# Patient Record
Sex: MALE | Race: WHITE | NOT HISPANIC OR LATINO | Employment: FULL TIME
[De-identification: names, ages, dates, MRNs, and addresses within clinical notes are randomized per-mention and may not be internally consistent; named-entity substitution may affect disease eponyms.]

---

## 2020-03-10 ENCOUNTER — HEALTH MAINTENANCE LETTER (OUTPATIENT)
Age: 35
End: 2020-03-10

## 2020-12-27 ENCOUNTER — HEALTH MAINTENANCE LETTER (OUTPATIENT)
Age: 35
End: 2020-12-27

## 2021-04-24 ENCOUNTER — HEALTH MAINTENANCE LETTER (OUTPATIENT)
Age: 36
End: 2021-04-24

## 2021-10-04 ENCOUNTER — HEALTH MAINTENANCE LETTER (OUTPATIENT)
Age: 36
End: 2021-10-04

## 2022-02-21 ENCOUNTER — VIRTUAL VISIT (OUTPATIENT)
Dept: PSYCHOLOGY | Facility: CLINIC | Age: 37
End: 2022-02-21
Payer: COMMERCIAL

## 2022-02-21 DIAGNOSIS — F91.8 CONDUCT DISORDER, UNDIFFERENTIATED TYPE: ICD-10-CM

## 2022-02-21 DIAGNOSIS — F41.1 GENERALIZED ANXIETY DISORDER: Primary | ICD-10-CM

## 2022-02-21 PROCEDURE — 90791 PSYCH DIAGNOSTIC EVALUATION: CPT | Mod: GT | Performed by: MARRIAGE & FAMILY THERAPIST

## 2022-02-21 PROCEDURE — 99207 PR NO BILLABLE SERVICE THIS VISIT: CPT | Performed by: MARRIAGE & FAMILY THERAPIST
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
## 2022-02-28 ENCOUNTER — VIRTUAL VISIT (OUTPATIENT)
Dept: PSYCHOLOGY | Facility: CLINIC | Age: 37
End: 2022-02-28
Payer: COMMERCIAL

## 2022-02-28 DIAGNOSIS — F41.1 GENERALIZED ANXIETY DISORDER: Primary | ICD-10-CM

## 2022-02-28 DIAGNOSIS — F91.8 CONDUCT DISORDER, UNDIFFERENTIATED TYPE: ICD-10-CM

## 2022-02-28 PROCEDURE — 90837 PSYTX W PT 60 MINUTES: CPT | Mod: GT | Performed by: MARRIAGE & FAMILY THERAPIST

## 2022-02-28 PROCEDURE — 99207 PR NO BILLABLE SERVICE THIS VISIT: CPT | Performed by: MARRIAGE & FAMILY THERAPIST

## 2022-02-28 NOTE — PROGRESS NOTES
[facility] - Progress Note    Date of Service: 22   Name: George C Kellerman  : 1985  Medical Record Number: [id number]  Treating Provider: VALERIA Zamora LADC  Type of Session: Individual  Present in Session: pt  Session Start and Stop Time: 6670-6341  Number of Minutes:  63    SERVICE MODALITY:  Video Visit:      Provider verified identity through the following two step process.  Patient provided:  Patient     Telemedicine Visit: The patient's condition can be safely assessed and treated via synchronous audio and visual telemedicine encounter.      Reason for Telemedicine Visit: Services only offered telehealth    Originating Site (Patient Location): Patient's home    Distant Site (Provider Location): Provider Remote Setting- Home Office    Consent:  The patient/guardian has verbally consented to: the potential risks and benefits of telemedicine (video visit) versus in person care; bill my insurance or make self-payment for services provided; and responsibility for payment of non-covered services.     Patient would like the video invitation sent by:  My Chart    Mode of Communication:  Video Conference via [facility]    As the provider I attest to compliance with applicable laws and regulations related to telemedicine.    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.8) Other specified Disruptive, Impulse Control and Conduct Disorder     Provisional Diagnostic Hypothesis (Explain R/O, other Provisional Diagnosis, and why alternative Diagnosis that were considered were ruled out): R/O ADHD      Current Reported Symptoms and Status update:  Pressing concern right now is needs vs wants-feelings for affair partner  Story tells himself-grew up struggled with anything sexual-with affair partner was comfortable being completely open and vulnerable with sexuality, feeling reciprocity and desire-feeling loved for who he is -feeling validated in  sexuality expression-    With wife sexuality was met with indifference-feeling that opposite of love is not hate its indifference-wife is ok with me being me but isnt in love with that part of me, is not in love with me with the part I want to be vulnerable with-no reciprocal desire, wouldn't be into having sex with him-she would force herself to engage with sex with him.    Thinks about contacting affair partner-wants some sort of closure-stops him-does not want to sneak and lie and try to live his values-trying to figure out if he is dysfunctional    Progress Toward Treatment Goals:   Created treatment plan in session     Therapeutic Interventions/Treatment Strategies:    Area(s) of treatment focus addressed in this session included Symptom Management.    Created treatment plan-started discussion about processing feelings for affair partner and being authentic and vulnerable     Psychotherapist offered support, feedback and validation. Treatment modalities used include Motivational Interviewing. Interventions include Relationship Skills: Discussed strategies to promote healthier understanding of interpersonal relationships.  Support, Structured Activity and Clarification    Patient Response:   Patient responded to session by accepting feedback, listening, focusing on goals, being attentive, accepting support and appearing alert  Possible barriers to participation / learning include: and no barriers identified    Current Mental Status Exam:   Appearance:  Appropriate   Eye Contact:  Good   Attitude / Demeanor: Cooperative   Speech      Rate / Production: Normal/ Responsive      Volume:  Normal  volume  Orientation:  All  Mood:   Anxious   Affect:   Appropriate   Thought Content: Clear   Insight:   Good       Plan/Need for Future Services:  Return for therapy in 2 weeks to treat diagnosed problems.    Patient has a current master individualized treatment plan and today was our weekly review of the patient's  "progress.  See Epic treatment plan for progress / updates on goals and plan.    Assignment:  Complete relationship map for yourself as you see yourself in your current relationship with wife-all of the other things that make up that map/system including your children/pets/relatives/friends-make sure to write down your thoughts/feelings/behaviors that come up most often for you in these relationships.  For contrast you will complete a separate relationship map as you see yourself as you \"want to be\" possibly with affair partner-write down the thoughts/feelings/behaviors that come up for you when viewing your relationship with yourself and others-it possible to include the same people from your first relationship map (wife/children/friends/family) but make sure to demonstrate how the relationship changes on the second map-demonstrate strengths/bonds/conflict by drawing or writing that out    Interactive Complexity:  There are four specific communication difficulties that complicate the work of the primary psychiatric procedure.  Interactive complexity (+38088) may be reported when at least one of these difficulties is present.    Communication difficulties present during current the psychiatric procedure include:  1. None.      Signature/Title:    VALERIA Zamora, [facility]     [facility]:  Individualized Treatment Plan     Date of Plan: 2022  Name: George C Kellerman MRN: [id number]  : 1985     DSM-V Diagnoses: 300.02 (F41.1) Generalized Anxiety Disorder  312.89 (F91.8) Other specified Disruptive, Impulse Control and Conduct Disorder     Provisional Diagnostic Hypothesis (Explain R/O, other Provisional Diagnosis, and why alternative Diagnosis that were considered were ruled out): R/O ADHD    Psychosocial / Contextual Factors: living separate from wife    Program: CSB    Referral / Collaboration:  Referral to another professional/service is not indicated at this " time..    SERVICE MODALITY:  Video Visit:      Provider verified identity through the following two step process.  Patient provided:  Patient     Telemedicine Visit: The patient's condition can be safely assessed and treated via synchronous audio and visual telemedicine encounter.      Reason for Telemedicine Visit: Services only offered telehealth    Originating Site (Patient Location): Patient's home    Distant Site (Provider Location): Provider Remote Setting- Home Office    Consent:  The patient/guardian has verbally consented to: the potential risks and benefits of telemedicine (video visit) versus in person care; bill my insurance or make self-payment for services provided; and responsibility for payment of non-covered services.     Patient would like the video invitation sent by:  My Chart    Mode of Communication:  Video Conference via [facility]    As the provider I attest to compliance with applicable laws and regulations related to telemedicine.    Start Date: 2022  Anticipated duration/number of sessions: 36 (pending authorization/clinical changes)    Impact of Symptoms on Function:  Decreased Physical/Health Status  Decreased Social/Family Function    Sexual Problems:  Compulsive sexual behavior     Gender Concerns:  denies    Client Strengths:  goal-focused, has a previous history of therapy, motivated, open to learning and open to suggestions / feedback    Client Participation in Plan:  Contributed to goals and plan   Attended individual treatment plan meeting on 2022  Agrees with plan   Received copy of treatment plan   Discussed with staff     Areas of Vulnerability:  Anxiety  Trauma/Abuse/Neglect    Long-Term Goals:  Knowledge about illness and management of symptoms   Effective management of impulsivity   Figuring out sexual identity-understanding differences between needs/wants and integrating inner story with outer presentation    Discharge Criteria:  Satisfactory progress toward  "treatment goals   Improvement re: identified problems and symptoms   Ability to continue recovery at next level of service   Has a discharge plan in place   Has safety/coping plan in place   Regular attendance as scheduled     Areas of Treatment Focus     Why are you seeking treatment/What do you want to focus on during treatment? \"recurrent feelings for affair partner, sexual identity, unknown unknowns\"       Area of Treatment Focus:   Symptom Stabilization and Management  Start Date:    2/28/2022    Goal: Target Date: 2/28/2023 Status: Active  -Will increase self-awareness, self-validation, authenticity. Practice stating your feelings, opinions, and preferences in group and in personal life., Use journaling to identify your experience of events in your daily life. and Let other people know something about yourself that you typically keep to yourself.,  - Will report on symptoms and identify skills to use to manage anxiety, CSB, cognitive distortions, emotional regulation, relationship boundaries  - Will increase daily structured activities by keeping a schedule as much as possible to reduce distractibility  - Will learn and practice 1-2 coping skills to help improve relationships, self compassion, vulnerability, self identity, explore sexual and relationship history   - Will plan and problem-solve to manage triggers, impulsivity, acting out behaviors  - Will utilize structured tasks to assess and improve relationship with self and others  -Will Improve Mindfulness / Stay in the Here and Now Regularly practice intentionally focusing on what is occurring in the environment, what you are sensing, thoughts that are popping into your head, emotions that you are feeling, without judging any of it, just noticing it.    -Will improve self-talk, reduce negative self-talk and increase neutral or positive self-talk. Given what you know about your own negative self-talk, create statements that challenge that self-talk, either " "in a positive or neutral tone.  These new self-statements are often most effective when they are phrased  in a way that uses I statements, have more detail or nuance and avoid using \"not\" \"no\" or other negatives.  -will process and explore feelings for affair partner with therapist  -will process and explore sexual identity (poly?) with therapist   -will process and explore needs vs wants-sexuality-sexual health and wellness    Progress:           Treatment Strategies:   Facilitate increased self awareness  Provide education regarding relationships, emotional regulation, sexual wellness  Teach adaptive coping skills and communication skills  Use reality based supportive approach   Intervention(s):  Therapist will assign homework bi weekly  Therapist will teach about healthy boundaries. .  Therapist will provide educational materials on topics discussed in therapy  role-play effective communication skills     Area of Treatment Focus:   Community Resources / Support and Discharge Planning  Start Date:    2/28/2022    Goal: Target Date: 2/28/2023 Status: Active  Will develop an aftercare / transition plan by by time of discharge          Progress:           Treatment Strategies:   Assist clients in establishing / strengthening support network  Assist with discharge planning  Assess / reassess for appropriate therapy program involvement, encourage participation in therapies  Teach adaptive coping skills and communication skills  Use reality based supportive approach   Intervention(s):  Therapist will make referrals to as needed     See treatment plan signature page for patient and provider signature     The Individualized Treatment Plan Signature Page has been routed to the provider for co-sign (as required).    VALERIA Zamora, [facility]      "

## 2022-02-28 NOTE — PROGRESS NOTES
[facility]  [address] [place] [address], [address]  [place] [address]  Phone: [phone]  Fax: [phone]  [email or web address]    [facility]:   Acknowledgement of Current Treatment Plan       I have reviewed my treatment plan with my therapist on 2/28/2022.   I agree with the plan as it is written in the electronic health record.    Name:      Signature:  George C Kellerman       Unable to sign due to covid 19 protocols   VALERIA Vela  Psychotherapist   VALERIA Zamora, [facility] on 2/28/2022 at 9:41 AM           Regulatory Guidelines for Updating Treatment Plan  [place] Medical Assistance: Reviewed & signed at least every 90days  Medicare:  Update per policy

## 2022-03-14 ENCOUNTER — VIRTUAL VISIT (OUTPATIENT)
Dept: PSYCHOLOGY | Facility: CLINIC | Age: 37
End: 2022-03-14
Payer: COMMERCIAL

## 2022-03-14 DIAGNOSIS — F41.1 GENERALIZED ANXIETY DISORDER: Primary | ICD-10-CM

## 2022-03-14 DIAGNOSIS — F91.8 CONDUCT DISORDER, UNDIFFERENTIATED TYPE: ICD-10-CM

## 2022-03-14 PROCEDURE — 90837 PSYTX W PT 60 MINUTES: CPT | Mod: GT | Performed by: MARRIAGE & FAMILY THERAPIST

## 2022-03-14 PROCEDURE — 99207 PR NO BILLABLE SERVICE THIS VISIT: CPT | Performed by: MARRIAGE & FAMILY THERAPIST

## 2022-03-14 NOTE — PROGRESS NOTES
[facility] - Progress Note    Date of Service: 3/14/22   Name: George C Kellerman  : 1985  Medical Record Number: [id number]  Treating Provider: VALERIA Zamora LADC  Type of Session: Individual  Present in Session: pt  Session Start and Stop Time: 9894-9361  Number of Minutes:  57    SERVICE MODALITY:  Video Visit:      Provider verified identity through the following two step process.  Patient provided:  Patient is known previously to provider    Telemedicine Visit: The patient's condition can be safely assessed and treated via synchronous audio and visual telemedicine encounter.      Reason for Telemedicine Visit: Services only offered telehealth    Originating Site (Patient Location): Patient's home    Distant Site (Provider Location): Provider Remote Setting- Home Office    Consent:  The patient/guardian has verbally consented to: the potential risks and benefits of telemedicine (video visit) versus in person care; bill my insurance or make self-payment for services provided; and responsibility for payment of non-covered services.     Patient would like the video invitation sent by:  My Chart    Mode of Communication:  Video Conference via [facility]    As the provider I attest to compliance with applicable laws and regulations related to telemedicine.    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.8) Other specified Disruptive, Impulse Control and Conduct Disorder     Provisional Diagnostic Hypothesis (Explain R/O, other Provisional Diagnosis, and why alternative Diagnosis that were considered were ruled out): R/O ADHD    Current Reported Symptoms and Status update:  Complete relationship map for yourself as you see yourself in your current relationship with wife-all of the other things that make up that map/system including your children/pets/relatives/friends-make sure to write down your thoughts/feelings/behaviors that come up most often  "for you in these relationships.  For contrast you will complete a separate relationship map as you see yourself as you \"want to be\" possibly with affair partner-write down the thoughts/feelings/behaviors that come up for you when viewing your relationship with yourself and others-it possible to include the same people from your first relationship map (wife/children/friends/family) but make sure to demonstrate how the relationship changes on the second map-demonstrate strengths/bonds/conflict by drawing or writing that out    Pt reports increased depression and anxiety symptoms, struggling with the concept of hope-feeling like his relationship is slipping away through his fingers-struggling the RENA point of \"recovery\" meaning no masturbation, increased despair over not being able to feel authentic or be vulnerable with wife, having idealized versions of the other partner-shared and discussed his thoughts about masturbation and how its impact in the relationship with his wife-  Progress Toward Treatment Goals:   Minimal progress - CONTEMPLATION (Considering change and yet undecided); Intervened by assessing the negative and positive thinking (ambivalence) about behavior change    Therapeutic Interventions/Treatment Strategies:    Area(s) of treatment focus addressed in this session included Symptom Management and Develop Socialization / Interpersonal Relationship Skills.    Pt processed his values, struggling with authenticity, vulnerability, processed the \"crisis of identity\" emotions of the past few weeks and increased struggle with feeling hopeful about relationship experiencing judgement, contempt-struggling with masturbation interpretation and judgements-trying to explore thoughts about masturbation    Psychotherapist offered support, feedback and validation and reinforced use of skills. Treatment modalities used include Motivational Interviewing, Cognitive Behavioral Therapy and Dialectical Behavioral Therapy. " Interventions include Cognitive Restructuring:  Assisted patient in formulating new neutral/positive alternatives to challenge less helpful / ineffective thoughts and Relationship Skills: Assisted patients in implementing more effective communication skills in their relationships and Discussed strategies to promote healthier understanding of interpersonal relationships.  Support, Feedback, Limit/Boundaries, Problem Solving, Clarification and Education    Patient Response:   Patient responded to session by accepting feedback, listening, being attentive, accepting support and appearing alert  Possible barriers to participation / learning include: severity of symptoms    Current Mental Status Exam:   Appearance:  Appropriate   Eye Contact:  Good   Attitude / Demeanor: Cooperative   Speech      Rate / Production: Normal/ Responsive      Volume:  Normal  volume  Orientation:  All  Mood:   Anxious  Sad   Affect:   Blunted  Subdued   Thought Content: Clear   Insight:   Good       Plan/Need for Future Services:  Return for therapy in 2 weeks to treat diagnosed problems.    Patient has a current master individualized treatment plan.  See Epic treatment plan for more information.    Assignment:  Do values assignment-  Watch [employer] video on [employer]-journal thoughts    Interactive Complexity:  There are four specific communication difficulties that complicate the work of the primary psychiatric procedure.  Interactive complexity (+60969) may be reported when at least one of these difficulties is present.    Communication difficulties present during current the psychiatric procedure include:  1. None.      Signature/Title:    Junior Winchester, VALERIA, [facility]

## 2022-03-28 ENCOUNTER — VIRTUAL VISIT (OUTPATIENT)
Dept: PSYCHOLOGY | Facility: CLINIC | Age: 37
End: 2022-03-28
Payer: COMMERCIAL

## 2022-03-28 DIAGNOSIS — F91.8 CONDUCT DISORDER, UNDIFFERENTIATED TYPE: ICD-10-CM

## 2022-03-28 DIAGNOSIS — F41.1 GENERALIZED ANXIETY DISORDER: Primary | ICD-10-CM

## 2022-03-28 PROCEDURE — 99207 PR NO BILLABLE SERVICE THIS VISIT: CPT | Performed by: MARRIAGE & FAMILY THERAPIST

## 2022-03-28 PROCEDURE — 90837 PSYTX W PT 60 MINUTES: CPT | Mod: GT | Performed by: MARRIAGE & FAMILY THERAPIST

## 2022-03-28 NOTE — PROGRESS NOTES
[facility] - Progress Note    Date of Service: 3/28/22   Name: George C Kellerman  : 1985  Medical Record Number: [id number]  Treating Provider: VALERIA Suazo LADC   Type of Session: Individual  Present in Session: pt  Session Start and Stop Time: 6155-2311  Number of Minutes:  55    SERVICE MODALITY:  Video Visit:      Provider verified identity through the following two step process.  Patient provided:  Patient  and Patient address    Telemedicine Visit: The patient's condition can be safely assessed and treated via synchronous audio and visual telemedicine encounter.      Reason for Telemedicine Visit: Patient has requested telehealth visit    Originating Site (Patient Location): Patient's home    Distant Site (Provider Location): Provider Remote Setting- Home Office    Consent:  The patient/guardian has verbally consented to: the potential risks and benefits of telemedicine (video visit) versus in person care; bill my insurance or make self-payment for services provided; and responsibility for payment of non-covered services.     Patient would like the video invitation sent by:  Send to e-mail at: [email or web address]    Mode of Communication:  Video Conference via [facility]    As the provider I attest to compliance with applicable laws and regulations related to telemedicine.    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.8) Other specified Disruptive, Impulse Control and Conduct Disorder     Provisional Diagnostic Hypothesis (Explain R/O, other Provisional Diagnosis, and why alternative Diagnosis that were considered were ruled out): R/O ADHD      Current Reported Symptoms and Status update:  Do values assignment-  Watch [employer] video on [employer]-journal thoughts    Shared [employer] video with wife-its hard to say looking back if its a part of the negative cycle, noticing how the conflict shows up subtly, was taught that being open  "and loving is a recipe for disaster, not to address things that are important, did not learn base level safety for conversations, increased anxiety over the past few weeks, did values work, ranked values-included safety for the first time to realize that safety is necessary for decision making,     Who is jasmyne based on values-love-romantic, safety (safe haven), fun, peace, independence  (pursue interests and dreams), wealth (supports fun), family, humor, reason, variety    Progress Toward Treatment Goals:   Minimal progress - CONTEMPLATION (Considering change and yet undecided); Intervened by assessing the negative and positive thinking (ambivalence) about behavior change    Therapeutic Interventions/Treatment Strategies:    Area(s) of treatment focus addressed in this session included Symptom Management and Develop Socialization / Interpersonal Relationship Skills.    Processed about conflict styles, what he learned from his family of origin, processed thoughts and feelings from emotions -not having strong concept of identity-explored what does it mean to \"be yourself\" and how he showed up in marriage vs affairs     Psychotherapist offered support, feedback and validation and reinforced use of skills. Treatment modalities used include Motivational Interviewing, Cognitive Behavioral Therapy and Dialectical Behavioral Therapy. Interventions include Cognitive Restructuring:  Facilitated recognition of the connection between negative thoughts and negative core beliefs and Relationship Skills: Assisted patients in implementing more effective communication skills in their relationships.  Support, Feedback, Problem Solving and Clarification    Patient Response:   Patient responded to session by accepting feedback, listening, being attentive, accepting support and appearing alert  Possible barriers to participation / learning include: and no barriers identified    Current Mental Status Exam:   Appearance:  Appropriate "   Eye Contact:  Good   Attitude / Demeanor: Cooperative   Speech      Rate / Production: Normal/ Responsive      Volume:  Normal  volume  Orientation:  All  Mood:   Anxious  Depressed   Affect:   Appropriate  Blunted   Thought Content: Clear   Insight:   Good       Plan/Need for Future Services:  Return for therapy in 2 weeks to treat diagnosed problems.    Patient has a current master individualized treatment plan.  See Epic treatment plan for more information.    Assignment:  Return in a few weeks, continue thinking about the actions that are paired with your values-and start thinking and writing down the limits and boundaries around the actions of the values    Interactive Complexity:  There are four specific communication difficulties that complicate the work of the primary psychiatric procedure.  Interactive complexity (+28165) may be reported when at least one of these difficulties is present.    Communication difficulties present during current the psychiatric procedure include:  1. None.      Signature/Title:    VALERIA Suazo, [facility]

## 2022-04-11 ENCOUNTER — VIRTUAL VISIT (OUTPATIENT)
Dept: PSYCHOLOGY | Facility: CLINIC | Age: 37
End: 2022-04-11
Payer: COMMERCIAL

## 2022-04-11 DIAGNOSIS — F91.8 CONDUCT DISORDER, UNDIFFERENTIATED TYPE: ICD-10-CM

## 2022-04-11 DIAGNOSIS — F41.1 GENERALIZED ANXIETY DISORDER: Primary | ICD-10-CM

## 2022-04-11 PROCEDURE — 99207 PR NO BILLABLE SERVICE THIS VISIT: CPT | Performed by: MARRIAGE & FAMILY THERAPIST

## 2022-04-11 PROCEDURE — 90837 PSYTX W PT 60 MINUTES: CPT | Mod: GT | Performed by: MARRIAGE & FAMILY THERAPIST

## 2022-04-11 NOTE — PROGRESS NOTES
[place] [facility] - Progress Note    Date of Service: 22   Name: George C Kellerman  : 1985  Medical Record Number: [id number]  Treating Provider: Kody Winchester  Type of Session: Individual  Present in Session: pt  Session Start and Stop Time:   Number of Minutes:  59    SERVICE MODALITY:  Video Visit:      Provider verified identity through the following two step process.  Patient provided:  Patient     Telemedicine Visit: The patient's condition can be safely assessed and treated via synchronous audio and visual telemedicine encounter.      Reason for Telemedicine Visit: Patient has requested telehealth visit    Originating Site (Patient Location): Patient's home    Distant Site (Provider Location): Provider Remote Setting- Home Office    Consent:  The patient/guardian has verbally consented to: the potential risks and benefits of telemedicine (video visit) versus in person care; bill my insurance or make self-payment for services provided; and responsibility for payment of non-covered services.     Patient would like the video invitation sent by:  My Chart    Mode of Communication:  Video Conference via [facility]    As the provider I attest to compliance with applicable laws and regulations related to telemedicine.    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.8) Other specified Disruptive, Impulse Control and Conduct Disorder     Provisional Diagnostic Hypothesis (Explain R/O, other Provisional Diagnosis, and why alternative Diagnosis that were considered were ruled out): R/O ADHD      Current Reported Symptoms and Status update:  Return in a few weeks, continue thinking about the actions that are paired with your values-and start thinking and writing down the limits and boundaries around the actions of the values    Pt is seeing support group on Monday 1.5 hours, during the week sees Felipa Antonio weekly for therapy 1:1-is exploring  emotional needs with her is working on 3 topics, started with EMDR-will see every 2 weeks; does a Wednesday and Saturday support group; Thursday night sex addicts anonymous, every 3 weeks sees psychiatrist for medication management    Explored homework and how peace and love are values-boundaries-did not have them, willing to have affairs, avoid emotional intimacy, now is deciding boundaries are living in a way that is not different on the outside, no secrets with wife-open with friends, independence, safety     Hard boundaries  flexibility with money-related to independence value  Need to feel love/peace deep down, presence of 2 parts making a whole, experiences I need to feel seen, desired, safe to be open, security means validation and desire-related to love and peace value    Progress Toward Treatment Goals:   Minimal progress - CONTEMPLATION (Considering change and yet undecided); Intervened by assessing the negative and positive thinking (ambivalence) about behavior change    Therapeutic Interventions/Treatment Strategies:    Area(s) of treatment focus addressed in this session included Symptom Management and Develop Socialization / Interpersonal Relationship Skills.    Discussed importance of having conjoint session-has concerns over how his issues are triggering for her, struggling with overthinking, explored boundaries    Psychotherapist offered support, feedback and validation and reinforced use of skills. Treatment modalities used include Motivational Interviewing, Cognitive Behavioral Therapy and Dialectical Behavioral Therapy. Interventions include Relationship Skills: Assisted patients in implementing more effective communication skills in their relationships, Encouraged development and maintenance  of healthy boundaries and Discussed strategies to promote healthier understanding of interpersonal relationships.  Support, Feedback, Problem Solving and Education    Patient Response:   Patient responded  to session by accepting feedback, listening, being attentive, accepting support, appearing alert and verbalizing understanding  Possible barriers to participation / learning include: and no barriers identified    Current Mental Status Exam:   Appearance:  Appropriate   Eye Contact:  Good   Attitude / Demeanor: Cooperative   Speech      Rate / Production: Normal/ Responsive      Volume:  Normal  volume  Orientation:  All  Mood:   Anxious   Affect:   Appropriate   Thought Content: Clear   Insight:   Good       Plan/Need for Future Services:  Return for therapy in 2 weeks to treat diagnosed problems.    Patient has a current master individualized treatment plan.  See Epic treatment plan for more information.    Assignment:  Work on triggers assignment     Interactive Complexity:  There are four specific communication difficulties that complicate the work of the primary psychiatric procedure.  Interactive complexity (+04825) may be reported when at least one of these difficulties is present.    Communication difficulties present during current the psychiatric procedure include:  1. None.      Signature/Title:    VALERIA Zamora, [facility]

## 2022-04-25 ENCOUNTER — VIRTUAL VISIT (OUTPATIENT)
Dept: PSYCHOLOGY | Facility: CLINIC | Age: 37
End: 2022-04-25
Payer: COMMERCIAL

## 2022-04-25 DIAGNOSIS — F91.8 CONDUCT DISORDER, UNDIFFERENTIATED TYPE: ICD-10-CM

## 2022-04-25 DIAGNOSIS — F41.1 GENERALIZED ANXIETY DISORDER: Primary | ICD-10-CM

## 2022-04-25 PROCEDURE — 99207 PR NO BILLABLE SERVICE THIS VISIT: CPT | Performed by: MARRIAGE & FAMILY THERAPIST

## 2022-04-25 PROCEDURE — 90837 PSYTX W PT 60 MINUTES: CPT | Mod: GT | Performed by: MARRIAGE & FAMILY THERAPIST

## 2022-04-25 NOTE — PROGRESS NOTES
[place] [facility] - Progress Note    Date of Service: 22   Name: George C Kellerman  : 1985  Medical Record Number: [id number]  Treating Provider: VALERIA Zamora LADC  Type of Session: Individual  Present in Session: pt  Session Start and Stop Time: 3105-9871  Number of Minutes:  54    SERVICE MODALITY:  Video Visit:      Provider verified identity through the following two step process.  Patient provided:  Patient is known previously to provider    Telemedicine Visit: The patient's condition can be safely assessed and treated via synchronous audio and visual telemedicine encounter.      Reason for Telemedicine Visit: Services only offered telehealth    Originating Site (Patient Location): Patient's home    Distant Site (Provider Location): Provider Remote Setting- Home Office    Consent:  The patient/guardian has verbally consented to: the potential risks and benefits of telemedicine (video visit) versus in person care; bill my insurance or make self-payment for services provided; and responsibility for payment of non-covered services.     Patient would like the video invitation sent by:  My Chart    Mode of Communication:  Video Conference via [facility]    As the provider I attest to compliance with applicable laws and regulations related to telemedicine.    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.8) Other specified Disruptive, Impulse Control and Conduct Disorder     Provisional Diagnostic Hypothesis (Explain R/O, other Provisional Diagnosis, and why alternative Diagnosis that were considered were ruled out): R/O ADHD      Current Reported Symptoms and Status update:  Pt did triggers homework, notices that he feels overwhelmed, has done a lot of therapy, cant see for the forest for the trees, feeling exasperated, partially has been here before, feeling distracted by it being every two weeks, feels slow moving, starts projecting-what does  my therapist want me to write vs what do I need to get out the exercise myself, doesn't feel like it is builidng as part of a larger puzzle-    Aftercare group meeting-led by  from the program-1x a week-1.5 hours-will probably be dropped soon  Sex therapist/individual once a week for an hour  arash 1x a week for an hour  Trauma therapist 1x a week for an hour-may be switching to a [employer] trauma therapist  Meets with support group 2x a week for an hour each time-guys getting together and talking  Couples therapy will start soon-    Needs more help with making healthy boundaries-asserting them or expressing them  Does not have the tools to examine-being seen for sexuality, reciprocation, only felt with affair partner, maybe being able to find it with wife but not being sure about it    Progress Toward Treatment Goals:   Minimal progress - CONTEMPLATION (Considering change and yet undecided); Intervened by assessing the negative and positive thinking (ambivalence) about behavior change    Therapeutic Interventions/Treatment Strategies:    Area(s) of treatment focus addressed in this session included Symptom Management and Develop Socialization / Interpersonal Relationship Skills.    Pt explored his concerns over the amount of treatment he is engaging in, discussed his feelings aorund the homework and wondering about the role of therapy to answer his big 3 questions     Psychotherapist offered support, feedback and validation and reinforced use of skills. Treatment modalities used include Motivational Interviewing and Cognitive Behavioral Therapy. Interventions include Relapse Prevention: Assisted patient in identifying the challenges and barriers to participation and attendance to support groups/community resources.  Support, Clarification and Education    Patient Response:   Patient responded to session by accepting feedback, listening, being attentive, accepting support, appearing alert and verbalizing  understanding  Possible barriers to participation / learning include: and no barriers identified    Current Mental Status Exam:   Appearance:  Appropriate   Eye Contact:  Good   Attitude / Demeanor: Cooperative   Speech      Rate / Production: Normal/ Responsive      Volume:  Normal  volume  Orientation:  All  Mood:   Anxious  Depressed   Affect:   Appropriate   Thought Content: Clear   Insight:   Good       Plan/Need for Future Services:  Return for therapy in 2 weeks to treat diagnosed problems.    Patient has a current master individualized treatment plan.  See Epic treatment plan for more information.    Assignment:  Fill out the triggers, boundaries, and review the negative core fuels     Interactive Complexity:  There are four specific communication difficulties that complicate the work of the primary psychiatric procedure.  Interactive complexity (+82710) may be reported when at least one of these difficulties is present.    Communication difficulties present during current the psychiatric procedure include:  1. None.      Signature/Title:    VALERIA Zamora, [facility]

## 2022-05-13 ENCOUNTER — VIRTUAL VISIT (OUTPATIENT)
Dept: PSYCHOLOGY | Facility: CLINIC | Age: 37
End: 2022-05-13
Payer: COMMERCIAL

## 2022-05-13 DIAGNOSIS — F41.1 GENERALIZED ANXIETY DISORDER: Primary | ICD-10-CM

## 2022-05-13 DIAGNOSIS — F91.8 CONDUCT DISORDER, UNDIFFERENTIATED TYPE: ICD-10-CM

## 2022-05-13 PROCEDURE — 99207 PR NO BILLABLE SERVICE THIS VISIT: CPT | Performed by: MARRIAGE & FAMILY THERAPIST

## 2022-05-13 PROCEDURE — 90837 PSYTX W PT 60 MINUTES: CPT | Mod: GT | Performed by: MARRIAGE & FAMILY THERAPIST

## 2022-05-13 NOTE — PROGRESS NOTES
[place] [facility] - Progress Note    Date of Service: 22   Name: George C Kellerman  : 1985  Medical Record Number: [id number]  Treating Provider: VALERIA Suazo LADC  Type of Session: Individual  Present in Session: pt  Session Start and Stop Time: 2297-4237  Number of Minutes:  60    SERVICE MODALITY:  Video Visit:      Provider verified identity through the following two step process.  Patient provided:  Patient  and Patient address    Telemedicine Visit: The patient's condition can be safely assessed and treated via synchronous audio and visual telemedicine encounter.      Reason for Telemedicine Visit: Services only offered telehealth    Originating Site (Patient Location): at a hotel but is in the [facility]    Distant Site (Provider Location): Provider Remote Setting- Home Office    Consent:  The patient/guardian has verbally consented to: the potential risks and benefits of telemedicine (video visit) versus in person care; bill my insurance or make self-payment for services provided; and responsibility for payment of non-covered services.     Patient would like the video invitation sent by:  My Chart    Mode of Communication:  Video Conference via well    As the provider I attest to compliance with applicable laws and regulations related to telemedicine.    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.8) Other specified Disruptive, Impulse Control and Conduct Disorder     Provisional Diagnostic Hypothesis (Explain R/O, other Provisional Diagnosis, and why alternative Diagnosis that were considered were ruled out): R/O ADHD      Current Reported Symptoms and Status update:  Fill out the triggers, boundaries, and review the negative core fuels     Pt is struggling to figure out what is compulsive and addiction vs numbing and being authentic explored the obsessive thoughts triggers and boundaries handout     Progress Toward Treatment  Goals:   Minimal progress - ACTION (Actively working towards change); Intervened by reinforcing change plan / affirming steps taken    Therapeutic Interventions/Treatment Strategies:    Area(s) of treatment focus addressed in this session included Symptom Management and Develop Socialization / Interpersonal Relationship Skills.    Pt processed about analysis paralysis struggling with the obsessive thoughts-fear that stops him-fear of hurting wife    Psychotherapist offered support, feedback and validation and reinforced use of skills. Treatment modalities used include Motivational Interviewing, Cognitive Behavioral Therapy and Dialectical Behavioral Therapy. Interventions include Cognitive Restructuring:  Assisted patient in formulating new neutral/positive alternatives to challenge less helpful / ineffective thoughts and Relationship Skills: Assisted patients in implementing more effective communication skills in their relationships and Discussed strategies to promote healthier understanding of interpersonal relationships.  Support, Redirection, Feedback, Structured Activity, Problem Solving, Clarification and Education    Patient Response:   Patient responded to session by accepting feedback, listening, focusing on goals, being attentive, accepting support, appearing alert and verbalizing understanding  Possible barriers to participation / learning include: and no barriers identified    Current Mental Status Exam:   Appearance:  Appropriate   Eye Contact:  Good   Attitude / Demeanor: Cooperative   Speech      Rate / Production: Normal/ Responsive      Volume:  Normal  volume  Orientation:  All  Mood:   Anxious   Affect:   Appropriate   Thought Content: Clear   Insight:   Good       Plan/Need for Future Services:  Return for therapy in 2 weeks to treat diagnosed problems.    Patient has a current master individualized treatment plan.  See Epic treatment plan for more information.    Assignment:  In this exercise,  you will want to define the characteristics that you want people to see and believe.  Place those characteristics in the middle of the sheet provided.    On the left side, describe your techniques of deception which you utilize to get people to see those characteristics.  On the right side, describe your techniques of authenticity - a different approach to impression management.    Interactive Complexity:  There are four specific communication difficulties that complicate the work of the primary psychiatric procedure.  Interactive complexity (+88362) may be reported when at least one of these difficulties is present.    Communication difficulties present during current the psychiatric procedure include:  1. None.      Signature/Title:    Kody Winchester, LMFT, [facility] [place]    Health Maintenance Summary - Mental Health Treatment Plan     This patient has no relevant Health Maintenance data.

## 2022-05-15 ENCOUNTER — HEALTH MAINTENANCE LETTER (OUTPATIENT)
Age: 37
End: 2022-05-15

## 2022-05-24 ENCOUNTER — VIRTUAL VISIT (OUTPATIENT)
Dept: PSYCHOLOGY | Facility: CLINIC | Age: 37
End: 2022-05-24
Payer: COMMERCIAL

## 2022-05-24 DIAGNOSIS — F41.1 GENERALIZED ANXIETY DISORDER: Primary | ICD-10-CM

## 2022-05-24 PROCEDURE — 90837 PSYTX W PT 60 MINUTES: CPT | Mod: GT | Performed by: MARRIAGE & FAMILY THERAPIST

## 2022-05-24 PROCEDURE — 99207 PR NO BILLABLE SERVICE THIS VISIT: CPT | Performed by: MARRIAGE & FAMILY THERAPIST

## 2022-05-24 NOTE — PROGRESS NOTES
[place] [facility] - Progress Note    Date of Service: 22   Name: George C Kellerman  : 1985  Medical Record Number: [id number]  Treating Provider: VALERIA Suazo LADC  Type of Session: Individual  Present in Session: pt  Session Start and Stop Time: 1301-2259  Number of Minutes:  53    SERVICE MODALITY:  Video Visit:      Provider verified identity through the following two step process.  Patient provided:  Patient  and Patient address    Telemedicine Visit: The patient's condition can be safely assessed and treated via synchronous audio and visual telemedicine encounter.      Reason for Telemedicine Visit: Services only offered telehealth    Originating Site (Patient Location): Patient's home    Distant Site (Provider Location): Provider Remote Setting- Home Office    Consent:  The patient/guardian has verbally consented to: the potential risks and benefits of telemedicine (video visit) versus in person care; bill my insurance or make self-payment for services provided; and responsibility for payment of non-covered services.     Patient would like the video invitation sent by:  My Chart    Mode of Communication:  Video Conference via well    As the provider I attest to compliance with applicable laws and regulations related to telemedicine.    DSM-5 Diagnoses:  300.02 (F41.1) Generalized Anxiety Disorder    Provisional Diagnostic Hypothesis (Explain R/O, other Provisional Diagnosis, and why alternative Diagnosis that were considered were ruled out): R/O ADHD      Current Reported Symptoms and Status update:  In this exercise, you will want to define the characteristics that you want people to see and believe.  Place those characteristics in the middle of the sheet provided.    On the left side, describe your techniques of deception which you utilize to get people to see those characteristics.  On the right side, describe your techniques of authenticity - a different  approach to impression management.    Progress Toward Treatment Goals:   Minimal progress - PREPARATION (Decided to change - considering how); Intervened by negotiating a change plan and determining options / strategies for behavior change, identifying triggers, exploring social supports, and working towards setting a date to begin behavior change    Therapeutic Interventions/Treatment Strategies:    Area(s) of treatment focus addressed in this session included Symptom Management and Develop Socialization / Interpersonal Relationship Skills.    Pt processed about reducing therapy-therapist educated pt on therapy/model, explored efficacy of therapy-discussed diagnosis and removal of compulsive sexual behavior -treatment of anxiety disorder and transfer to the REST program to increase  Congruence with sexuality and relationship expression     Psychotherapist offered support, feedback and validation and reinforced use of skills. Treatment modalities used include Motivational Interviewing, Cognitive Behavioral Therapy, Dialectical Behavioral Therapy and attachment . Interventions include Relationship Skills: Assisted patients in implementing more effective communication skills in their relationships and Discussed strategies to promote healthier understanding of interpersonal relationships.  Support, Feedback, Structured Activity, Problem Solving, Clarification and Education    Patient Response:   Patient responded to session by accepting feedback, listening, focusing on goals, being attentive, accepting support, appearing alert and verbalizing understanding  Possible barriers to participation / learning include: and no barriers identified    Current Mental Status Exam:   Appearance:  Appropriate   Eye Contact:  Good   Attitude / Demeanor: Cooperative   Speech      Rate / Production: Normal/ Responsive      Volume:  Normal  volume  Orientation:  All  Mood:   Anxious   Affect:   Blunted   Thought Content: Clear    Insight:   Good       Plan/Need for Future Services:  Return for therapy in 2 weeks to treat diagnosed problems.    Patient has a current master individualized treatment plan.  See Epic treatment plan for more information.    Assignment:  In this exercise, you will want to define the characteristics that you want people to see and believe.  Place those characteristics in the middle of the sheet provided.    On the left side, describe your techniques of deception which you utilize to get people to see those characteristics.  On the right side, describe your techniques of authenticity - a different approach to impression management.    Interactive Complexity:  There are four specific communication difficulties that complicate the work of the primary psychiatric procedure.  Interactive complexity (+[id number]) may be reported when at least one of these difficulties is present.    Communication difficulties present during current the psychiatric procedure include:  1. None.      Signature/Title:    VALERIA Suazo, [facility]

## 2022-06-20 ENCOUNTER — VIRTUAL VISIT (OUTPATIENT)
Dept: PSYCHOLOGY | Facility: CLINIC | Age: 37
End: 2022-06-20
Payer: COMMERCIAL

## 2022-06-20 DIAGNOSIS — F41.1 GENERALIZED ANXIETY DISORDER: Primary | ICD-10-CM

## 2022-06-20 PROCEDURE — 90834 PSYTX W PT 45 MINUTES: CPT | Mod: GT | Performed by: MARRIAGE & FAMILY THERAPIST

## 2022-06-20 PROCEDURE — 99207 PR NO BILLABLE SERVICE THIS VISIT: CPT | Performed by: MARRIAGE & FAMILY THERAPIST

## 2022-06-20 NOTE — PROGRESS NOTES
[facility] - Progress Note    Date of Service: 22   Name: George C Kellerman  : 1985  Medical Record Number: [id number]  Treating Provider:KASHMIR Anderson  Type of Session: Individual  Present in Session: pt  Session Start and Stop Time: 2906-2050  Number of Minutes:  45    SERVICE MODALITY:  Video Visit:      Provider verified identity through the following two step process.  Patient provided:  Patient  and Patient address    Telemedicine Visit: The patient's condition can be safely assessed and treated via synchronous audio and visual telemedicine encounter.      Reason for Telemedicine Visit: Patient has requested telehealth visit    Originating Site (Patient Location): Patient's home    Distant Site (Provider Location): Provider Remote Setting- Home Office    Consent:  The patient/guardian has verbally consented to: the potential risks and benefits of telemedicine (video visit) versus in person care; bill my insurance or make self-payment for services provided; and responsibility for payment of non-covered services.     Patient would like the video invitation sent by:  My Chart    Mode of Communication:  Video Conference via [employer]    As the provider I attest to compliance with applicable laws and regulations related to telemedicine.    DSM-5 Diagnoses:  300.02 (F41.1) Generalized Anxiety Disorder      Current Reported Symptoms and Status update:  Assignment:  In this exercise, you will want to define the characteristics that you want people to see and believe.  Place those characteristics in the middle of the sheet provided.    On the left side, describe your techniques of deception which you utilize to get people to see those characteristics.  On the right side, describe your techniques of authenticity - a different approach to impression management.  Did not complete homework-has been working on living-not being in head    Progress Toward Treatment Goals:    Minimal progress - ACTION (Actively working towards change); Intervened by reinforcing change plan / affirming steps taken    Therapeutic Interventions/Treatment Strategies:    Area(s) of treatment focus addressed in this session included Symptom Management and Develop Socialization / Interpersonal Relationship Skills.    Working on living-staying out of head-has been so focused on meaning of affairs-what is love etc, has been feeling more at peace since focusing on living and staying out of head    Psychotherapist offered support, feedback and validation and reinforced use of skills. Treatment modalities used include Dialectical Behavioral Therapy. Interventions include Behavioral Activation: Explored how behaviors effect mood and interact with thoughts and feelings and Encouraged strategies to reduce individual procrastination and increase motivation by increasing goal-directed activities to enhance mood and reduce symptoms. and Cognitive Restructuring:  Assisted patient in formulating new neutral/positive alternatives to challenge less helpful / ineffective thoughts.  Support, Feedback, Problem Solving, Clarification and Education    Patient Response:   Patient responded to session by accepting feedback, listening, being attentive, accepting support, appearing alert, demonstrating behavior change and verbalizing understanding  Possible barriers to participation / learning include: and no barriers identified    Current Mental Status Exam:   Appearance:  Appropriate   Eye Contact:  Good   Attitude / Demeanor: Cooperative   Speech      Rate / Production: Normal/ Responsive      Volume:  Normal  volume  Orientation:  All  Mood:   Anxious   Affect:   Appropriate   Thought Content: Clear   Insight:   Good       Plan/Need for Future Services:  Return for therapy in 5 weeks to treat diagnosed problems.    Patient has a current master individualized treatment plan.  See Epic treatment plan for more  information.    Assignment:  Continue working on staying in the moment, refocusing on action, using action and working on relationship while continuing to care for self     Interactive Complexity:  There are four specific communication difficulties that complicate the work of the primary psychiatric procedure.  Interactive complexity (+83012) may be reported when at least one of these difficulties is present.    Communication difficulties present during current the psychiatric procedure include:  1. None.      Signature/Title:    Kody Winchester LMFT, [facility]

## 2022-08-01 ENCOUNTER — VIRTUAL VISIT (OUTPATIENT)
Dept: PSYCHOLOGY | Facility: CLINIC | Age: 37
End: 2022-08-01
Payer: COMMERCIAL

## 2022-08-01 DIAGNOSIS — F41.1 GENERALIZED ANXIETY DISORDER: Primary | ICD-10-CM

## 2022-08-01 PROCEDURE — 90837 PSYTX W PT 60 MINUTES: CPT | Mod: GT | Performed by: MARRIAGE & FAMILY THERAPIST

## 2022-08-01 NOTE — PROGRESS NOTES
Case Coordination And Contacts Made       Name: George C Kellerman MRN: [id number]    : 1985    Area of Focus:  Other: Discharge/Treatment slowing down     Discussed Bang seeing me in about 3 months or so for a follow up to check in about progress.  Reports progress with relationships and active in skill  Use.    Junior Winchester, VALERIA, [facility] on 2022 at 10:53 AM

## 2022-08-01 NOTE — PROGRESS NOTES
[facility] - Progress Note    Date of Service: 22   Name: George C Kellerman  : 1985  Medical Record Number: [id number]  Treating Provider: VALERIA Suazo LADC  Type of Session: Individual  Present in Session: pt  Session Start and Stop Time: 6704-3007  Number of Minutes:  53    SERVICE MODALITY:  Video Visit:      Provider verified identity through the following two step process.  Patient provided:  Patient is known previously to provider    Telemedicine Visit: The patient's condition can be safely assessed and treated via synchronous audio and visual telemedicine encounter.      Reason for Telemedicine Visit: Services only offered telehealth    Originating Site (Patient Location): Patient's home    Distant Site (Provider Location): Provider Remote Setting- Home Office    Consent:  The patient/guardian has verbally consented to: the potential risks and benefits of telemedicine (video visit) versus in person care; bill my insurance or make self-payment for services provided; and responsibility for payment of non-covered services.     Patient would like the video invitation sent by:  My Chart    Mode of Communication:  Video Conference via [employer]    As the provider I attest to compliance with applicable laws and regulations related to telemedicine.    DSM-5 Diagnoses:  300.02 (F41.1) Generalized Anxiety Disorder      Current Reported Symptoms and Status update:  Working on finding congruence between thoughts and actions around past affair    Progress Toward Treatment Goals:   Minimal progress - ACTION (Actively working towards change); Intervened by reinforcing change plan / affirming steps taken    Therapeutic Interventions/Treatment Strategies:    Area(s) of treatment focus addressed in this session included Symptom Management and Develop Socialization / Interpersonal Relationship Skills.    Assignment:  Continue working on staying in the moment, refocusing on  "action, using action and working on relationship while continuing to care for self     Has been doing a lot more conversations about feelings with wife rabia-may not get \"all of needs met\"    Continually working on mindfulness    Psychotherapist offered support, feedback and validation and reinforced use of skills. Treatment modalities used include Cognitive Behavioral Therapy and Dialectical Behavioral Therapy. Interventions include Mindfulness: Encouraged a plan to use mindfulness skills in daily life and Relationship Skills: Assisted patients in implementing more effective communication skills in their relationships and Discussed strategies to promote healthier understanding of interpersonal relationships.  Support, Feedback, Problem Solving and Clarification    Patient Response:   Patient responded to session by accepting feedback, listening, being attentive, accepting support, appearing alert and verbalizing understanding  Possible barriers to participation / learning include: and no barriers identified    Current Mental Status Exam:   Appearance:  Appropriate   Eye Contact:  Good   Attitude / Demeanor: Cooperative   Speech      Rate / Production: Normal/ Responsive      Volume:  Normal  volume  Orientation:  All  Mood:   Normal  Affect:   Appropriate   Thought Content: Clear   Insight:   Good       Plan/Need for Future Services:  Return for therapy in a few months to check up on diagnosed problems.    Patient has a current master individualized treatment plan.  See Epic treatment plan for more information.    Assignment:  Wants to continue working on goal oriented activity toward building relationship with family and wife-  Settled into job-  Continue filling bucket with activities that sustain him      Interactive Complexity:  There are four specific communication difficulties that complicate the work of the primary psychiatric procedure.  Interactive complexity (+14769) may be reported when at least one of " these difficulties is present.    Communication difficulties present during current the psychiatric procedure include:  1. None.      Signature/Title:    VALERIA Suazo, [facility]

## 2022-09-11 ENCOUNTER — HEALTH MAINTENANCE LETTER (OUTPATIENT)
Age: 37
End: 2022-09-11

## 2023-06-03 ENCOUNTER — HEALTH MAINTENANCE LETTER (OUTPATIENT)
Age: 38
End: 2023-06-03

## 2024-07-13 ENCOUNTER — HEALTH MAINTENANCE LETTER (OUTPATIENT)
Age: 39
End: 2024-07-13

## 2025-07-19 ENCOUNTER — HEALTH MAINTENANCE LETTER (OUTPATIENT)
Age: 40
End: 2025-07-19